# Patient Record
Sex: MALE | Race: WHITE
[De-identification: names, ages, dates, MRNs, and addresses within clinical notes are randomized per-mention and may not be internally consistent; named-entity substitution may affect disease eponyms.]

---

## 2020-01-07 ENCOUNTER — HOSPITAL ENCOUNTER (EMERGENCY)
Dept: HOSPITAL 25 - UCCORT | Age: 35
Discharge: HOME | End: 2020-01-07
Payer: COMMERCIAL

## 2020-01-07 VITALS — DIASTOLIC BLOOD PRESSURE: 85 MMHG | SYSTOLIC BLOOD PRESSURE: 152 MMHG

## 2020-01-07 DIAGNOSIS — H65.93: ICD-10-CM

## 2020-01-07 DIAGNOSIS — Z91.030: ICD-10-CM

## 2020-01-07 DIAGNOSIS — R03.0: ICD-10-CM

## 2020-01-07 DIAGNOSIS — J32.9: Primary | ICD-10-CM

## 2020-01-07 LAB
FLUAV RNA SPEC QL NAA+PROBE: NEGATIVE
FLUBV RNA SPEC QL NAA+PROBE: NEGATIVE

## 2020-01-07 PROCEDURE — 87651 STREP A DNA AMP PROBE: CPT

## 2020-01-07 PROCEDURE — 99202 OFFICE O/P NEW SF 15 MIN: CPT

## 2020-01-07 PROCEDURE — G0463 HOSPITAL OUTPT CLINIC VISIT: HCPCS

## 2020-01-07 NOTE — UC
UC General HPI





- HPI Summary


HPI Summary: 





RN triage - Past 2 days sore throat, post nasal drip and bilateral ear pain. 





33 yo gentleman c /o last 2 1/2 days progressively worse sinus congestion, 

bilat ear pain, sore throat.  Mild cough.  No fever / chills.  No rash.  No GI 

issues reported.  


PMH also noteworthy for episodic vertigo (not now) and hx septal deviation sx.  

Does not currently have ENT / PCP.  





- History of Current Complaint


Chief Complaint: UCGeneralIllness


Stated Complaint: SORE THROAT


Time Seen by Provider: 01/07/20 19:08


Hx Obtained From: Patient


Pain Intensity: 4





- Allergy/Home Medications


Allergies/Adverse Reactions: 


 Allergies











Allergy/AdvReac Type Severity Reaction Status Date / Time


 


Bee stings Allergy  Hives Uncoded 01/07/20 19:04














PMH/Surg Hx/FS Hx/Imm Hx


Previously Healthy: Yes





- Surgical History


Surgical History: Yes


Surgery Procedure, Year, and Place: nasal surgery 7/16





- Family History


Known Family History: Positive: Hypertension





- Social History


Alcohol Use: None


Substance Use Type: None


Smoking Status (MU): Never Smoked Tobacco





Review of Systems


All Other Systems Reviewed And Are Negative: Yes


Constitutional: Positive: Negative


Skin: Positive: Negative


Eyes: Positive: Negative


ENT: Positive: Other - see hpi


Respiratory: Positive: Other - see hpi


Cardiovascular: Positive: Negative


Gastrointestinal: Positive: Negative


Genitourinary: Positive: Negative


Motor: Positive: Negative


Neurovascular: Positive: Negative


Musculoskeletal: Positive: Negative


Neurological: Positive: Negative


Psychological: Positive: Negative


Is Patient Immunocompromised?: No





Physical Exam


Triage Information Reviewed: Yes


Appearance: Well-Nourished - sitting up, conversing easily


Vital Signs: 


 Initial Vital Signs











Temp  98.5 F   01/07/20 19:00


 


Pulse  91   01/07/20 19:00


 


Resp  16   01/07/20 19:00


 


BP  152/85   01/07/20 19:00


 


Pulse Ox  99   01/07/20 19:00











Vital Signs Reviewed: Yes


Eye Exam: Other - watery eyes, mildly injected ou


ENT: Positive: Pharyngeal erythema - post pharyng redness, uvula midline, no 

sores / exudates, Nasal congestion, Nasal drainage, Other - R TM dull, dark grey

, rtx'd.   L TM clear


Neck exam: Normal


Neck: Positive: Supple, Nontender


Respiratory Exam: Normal - RR normal, no dypnea / tachypnea


Cardiovascular Exam: Normal - HR normal, nondiaphoretic


Abdominal Exam: Normal


Abdomen Description: Positive: Nontender


Musculoskeletal Exam: Normal


Neurological Exam: Normal - grossly nonfocal


Psychological Exam: Normal - nad


Skin Exam: Normal - no visible or reported rash





Course/Dx





- Course


Course Of Treatment: 





Influenza a/b neg


RST neg





Reviewed coa / tx plan. 





Questions answered to the best of my ability. 





Encourage f/u PCP and ENT (hx vertigo, also I suspect an element of eustecian 

tube dysfxn). 





BP reviewed. 





- Diagnoses


Provider Diagnosis: 


 Sinusitis, Serous otitis media








Discharge ED





- Sign-Out/Discharge


Documenting (check all that apply): Patient Departure


All imaging exams completed and their final reports reviewed: No Studies





- Discharge Plan


Condition: Stable


Disposition: HOME


Prescriptions: 


Azithromycin TAB* [Zithromax TAB (Z-NAYELI) 250 mg #6 tabs] 250 mg PO DAILY #4 tab


Patient Education Materials:  Sinusitis (ED), Serous Otitis Media (ED)


Referrals: 


Charles Jaime MD [Medical Doctor] - 


No Primary Care Phys,NOPCP [Primary Care Provider] - 


Additional Instructions: 


Please schedule an appointment with ENT doctor (re vertigo), routine 

appointment. 





Please schedule an appointment with a primary care physician as soon as you are 

able. 





Blood pressure today 152/85, elevated.  Recommend recheck within 4 weeks (

primary care physician). 





Seek medical attention for worse or new problems.   





- Billing Disposition and Condition


Condition: STABLE


Disposition: Home

## 2020-02-02 ENCOUNTER — HOSPITAL ENCOUNTER (EMERGENCY)
Dept: HOSPITAL 25 - UCCORT | Age: 35
Discharge: HOME | End: 2020-02-02
Payer: COMMERCIAL

## 2020-02-02 VITALS — SYSTOLIC BLOOD PRESSURE: 149 MMHG | DIASTOLIC BLOOD PRESSURE: 75 MMHG

## 2020-02-02 DIAGNOSIS — R11.0: ICD-10-CM

## 2020-02-02 DIAGNOSIS — R10.9: ICD-10-CM

## 2020-02-02 DIAGNOSIS — Z91.030: ICD-10-CM

## 2020-02-02 DIAGNOSIS — R11.10: Primary | ICD-10-CM

## 2020-02-02 PROCEDURE — G0463 HOSPITAL OUTPT CLINIC VISIT: HCPCS

## 2020-02-02 PROCEDURE — 99211 OFF/OP EST MAY X REQ PHY/QHP: CPT

## 2020-02-02 NOTE — UC
Bite Injury/Animal HPI





- HPI Summary


HPI Summary: 





Nausea started last night with one episode of vomiting today.  Has been 

drinking fluids only since then.  Assoc. w/ abd cramping. denies sick contacts


did not get the flu shot this year.





- History of Current Complaint


Chief Complaint: UCGeneralIllness


Stated Complaint: FEVER/VOMITING


Time Seen by Provider: 02/02/20 17:16


Hx Obtained From: Patient


Pain Intensity: 0


Aggravating Factor(s): Nothing


Alleviating Factor(s): Nothing





- Allergies/Home Medications


Allergies/Adverse Reactions: 


 Allergies











Allergy/AdvReac Type Severity Reaction Status Date / Time


 


Bee stings Allergy  Hives Uncoded 02/02/20 17:13











Home Medications: 


 Home Medications





Ibuprofen TAB* [Advil TAB*] 600 mg PO ONCE 02/02/20 [History Confirmed 02/02/20]











PMH/Surg Hx/FS Hx/Imm Hx





- Additional Past Medical History


Additional PMH: 





no chronic illness


Previously Healthy: Yes





- Surgical History


Surgical History: Yes


Surgery Procedure, Year, and Place: nasal surgery 7/16





- Family History


Known Family History: Positive: Hypertension





- Social History


Alcohol Use: None


Substance Use Type: None


Smoking Status (MU): Never Smoked Tobacco





Review of Systems


All Other Systems Reviewed And Are Negative: Yes


Constitutional: Positive: Fever


Skin: Negative: Rash


ENT: Negative: Sore Throat - x1


Respiratory: Negative: Cough


Gastrointestinal: Positive: Vomiting.  Negative: Abdominal Pain, Nausea


Genitourinary: Negative: Dysuria


Neurological: Negative: Headache





Physical Exam


Triage Information Reviewed: Yes


Appearance: Well-Appearing


Vital Signs: 


 Initial Vital Signs











Temp  97.8 F   02/02/20 17:14


 


Pulse  106   02/02/20 17:14


 


Resp  18   02/02/20 17:14


 


BP  149/75   02/02/20 17:14


 


Pulse Ox  100   02/02/20 17:14











Vital Signs Reviewed: Yes


Eyes: Positive: Conjunctiva Clear


ENT: Positive: Pharynx normal, TMs normal, Uvula midline, Other - mmm


Neck: Positive: Supple


Respiratory Exam: Normal


Cardiovascular Exam: Normal


Abdomen Description: Positive: Nontender, Soft.  Negative: CVA Tenderness (R), 

CVA Tenderness (L)


Neurological: Positive: Alert


Skin: Negative: Rashes





Bite Injury Course/Dx





- Course


Course Of Treatment: 


One episode of vomiting in a well hydrated pt. on exam. vitals good.  rapid flu 

neg.  likely viral etiology.  we disc ways to tx and manage.





- Differential Dx/Diagnosis


Differential Diagnosis/HQI/PQRI: Other


Provider Diagnosis: 


 Vomiting








Discharge ED





- Sign-Out/Discharge


Documenting (check all that apply): Patient Departure


All imaging exams completed and their final reports reviewed: No Studies





- Discharge Plan


Condition: Good


Disposition: HOME


Patient Education Materials:  Acute Nausea and Vomiting (ED)


Referrals: 


No Primary Care Phys,NOPCP [Primary Care Provider] - 


Additional Instructions: 


Please read the patient education





- Billing Disposition and Condition


Condition: GOOD


Disposition: Home